# Patient Record
Sex: MALE | Race: WHITE
[De-identification: names, ages, dates, MRNs, and addresses within clinical notes are randomized per-mention and may not be internally consistent; named-entity substitution may affect disease eponyms.]

---

## 2019-12-16 ENCOUNTER — HOSPITAL ENCOUNTER (INPATIENT)
Dept: HOSPITAL 4 - SED | Age: 44
LOS: 2 days | Discharge: HOME | DRG: 720 | End: 2019-12-18
Attending: INTERNAL MEDICINE | Admitting: INTERNAL MEDICINE
Payer: MEDICAID

## 2019-12-16 VITALS — SYSTOLIC BLOOD PRESSURE: 132 MMHG

## 2019-12-16 VITALS — BODY MASS INDEX: 39.17 KG/M2 | HEIGHT: 75 IN | WEIGHT: 315 LBS

## 2019-12-16 VITALS — SYSTOLIC BLOOD PRESSURE: 144 MMHG

## 2019-12-16 VITALS — SYSTOLIC BLOOD PRESSURE: 127 MMHG

## 2019-12-16 DIAGNOSIS — L97.929: ICD-10-CM

## 2019-12-16 DIAGNOSIS — L97.919: ICD-10-CM

## 2019-12-16 DIAGNOSIS — A41.9: Primary | ICD-10-CM

## 2019-12-16 DIAGNOSIS — L03.115: ICD-10-CM

## 2019-12-16 DIAGNOSIS — E66.01: ICD-10-CM

## 2019-12-16 DIAGNOSIS — M25.519: ICD-10-CM

## 2019-12-16 DIAGNOSIS — Z59.0: ICD-10-CM

## 2019-12-16 DIAGNOSIS — L03.116: ICD-10-CM

## 2019-12-16 DIAGNOSIS — F19.10: ICD-10-CM

## 2019-12-16 DIAGNOSIS — G89.29: ICD-10-CM

## 2019-12-16 DIAGNOSIS — I87.2: ICD-10-CM

## 2019-12-16 LAB
ALBUMIN SERPL BCP-MCNC: 3.4 G/DL (ref 3.4–4.8)
ALT SERPL W P-5'-P-CCNC: 32 U/L (ref 12–78)
AMPHETAMINES UR QL SCN: POSITIVE
ANION GAP SERPL CALCULATED.3IONS-SCNC: 6 MMOL/L (ref 5–15)
APPEARANCE UR: CLEAR
AST SERPL W P-5'-P-CCNC: 15 U/L (ref 10–37)
BACTERIA URNS QL MICRO: (no result) /HPF
BARBITURATES UR QL SCN: NEGATIVE
BASOPHILS # BLD AUTO: 0 K/UL (ref 0–0.2)
BASOPHILS NFR BLD AUTO: 0.2 % (ref 0–2)
BENZODIAZ UR QL SCN: NEGATIVE
BILIRUB SERPL-MCNC: 0.7 MG/DL (ref 0–1)
BILIRUB UR QL STRIP: NEGATIVE
BUN SERPL-MCNC: 16 MG/DL (ref 8–21)
BZE UR QL SCN: NEGATIVE
CALCIUM SERPL-MCNC: 8.7 MG/DL (ref 8.4–11)
CANNABINOIDS UR QL SCN: NEGATIVE
CHLORIDE SERPL-SCNC: 99 MMOL/L (ref 98–107)
COLOR UR: YELLOW
CREAT SERPL-MCNC: 1.19 MG/DL (ref 0.55–1.3)
EOSINOPHIL # BLD AUTO: 0 K/UL (ref 0–0.4)
EOSINOPHIL NFR BLD AUTO: 0.2 % (ref 0–4)
ERYTHROCYTE [DISTWIDTH] IN BLOOD BY AUTOMATED COUNT: 13.2 % (ref 9–15)
GFR SERPL CREATININE-BSD FRML MDRD: 86 ML/MIN (ref 90–?)
GLUCOSE SERPL-MCNC: 144 MG/DL (ref 70–99)
GLUCOSE UR STRIP-MCNC: NEGATIVE MG/DL
HCT VFR BLD AUTO: 43.2 % (ref 36–54)
HGB BLD-MCNC: 15 G/DL (ref 14–18)
HGB UR QL STRIP: (no result)
KETONES UR STRIP-MCNC: NEGATIVE MG/DL
LEUKOCYTE ESTERASE UR QL STRIP: NEGATIVE
LYMPHOCYTES # BLD AUTO: 1.2 K/UL (ref 1–5.5)
LYMPHOCYTES NFR BLD AUTO: 8.1 % (ref 20.5–51.5)
MCH RBC QN AUTO: 32 PG (ref 27–31)
MCHC RBC AUTO-ENTMCNC: 35 % (ref 32–36)
MCV RBC AUTO: 91 FL (ref 79–98)
METHADONE UR-SCNC: NEGATIVE UMOL/L
METHAMPHET UR-SCNC: POSITIVE UMOL/L
MONOCYTES # BLD MANUAL: 0.5 K/UL (ref 0–1)
MONOCYTES # BLD MANUAL: 3.3 % (ref 1.7–9.3)
MUCOUS THREADS URNS QL MICRO: (no result) /LPF
NEUTROPHILS # BLD AUTO: 13.1 K/UL (ref 1.8–7.7)
NEUTROPHILS NFR BLD AUTO: 88.2 % (ref 40–70)
NITRITE UR QL STRIP: NEGATIVE
OPIATES UR QL SCN: POSITIVE
OXYCODONE SERPL-MCNC: NEGATIVE NG/ML
PCP UR QL SCN: NEGATIVE
PH UR STRIP: 6.5 [PH] (ref 5–8)
PLATELET # BLD AUTO: 186 K/UL (ref 130–430)
POTASSIUM SERPL-SCNC: 3.8 MMOL/L (ref 3.5–5.1)
PROT UR QL STRIP: NEGATIVE
RBC # BLD AUTO: 4.77 MIL/UL (ref 4.2–6.2)
RBC #/AREA URNS HPF: (no result) /HPF (ref 0–3)
SODIUM SERPLBLD-SCNC: 133 MMOL/L (ref 136–145)
SP GR UR STRIP: 1.01 (ref 1–1.03)
TRICYCLICS UR-MCNC: NEGATIVE NG/ML
URINE PROPOXYPHENE SCREEN: NEGATIVE
UROBILINOGEN UR STRIP-MCNC: 0.2 MG/DL (ref 0.2–1)
WBC # BLD AUTO: 14.8 K/UL (ref 4.8–10.8)
WBC #/AREA URNS HPF: (no result) /HPF (ref 0–3)

## 2019-12-16 RX ADMIN — ENOXAPARIN SODIUM SCH MG: 40 INJECTION SUBCUTANEOUS at 21:00

## 2019-12-16 RX ADMIN — HYDROCODONE BITARTRATE AND ACETAMINOPHEN PRN TAB: 10; 325 TABLET ORAL at 18:01

## 2019-12-16 RX ADMIN — SODIUM CHLORIDE SCH MLS/HR: 9 INJECTION, SOLUTION INTRAVENOUS at 17:08

## 2019-12-16 RX ADMIN — DOCUSATE SODIUM SCH MG: 250 CAPSULE, LIQUID FILLED ORAL at 21:00

## 2019-12-16 RX ADMIN — DEXTROSE MONOHYDRATE SCH MLS/HR: 50 INJECTION, SOLUTION INTRAVENOUS at 21:05

## 2019-12-16 SDOH — ECONOMIC STABILITY - HOUSING INSECURITY: HOMELESSNESS: Z59.0

## 2019-12-16 NOTE — NUR
Medications

given. Educated the action and side effects of medications. Patient verbalized 
understanding. No adverse effects noted. Patient sitting in bed, eating food brought by 
wife. Provided patient with ice. No other needs. Call light with the patient. Safety 
precautions in place.

## 2019-12-16 NOTE — NUR
Administered Levofloxacin 750mg IV running 100ml/hr as ordered by Dr. Rodney.  
Patient tolerated the medication well.

## 2019-12-16 NOTE — NUR
ADMISSION:

The patient, JADE PRABHAKAR, 45 y/o, M admitted by , was given written information regarding 
hospital policies, unit procedures and contact persons.  



Valuables were checked and .

## 2019-12-16 NOTE — NUR
Patient will be admitted to care of Dr. George.  Admitted to Medsurg unit.  Will 
go to room 117B.  Belongings list completed.  Complete and up to date summary 
report printed. SBAR report given to Yani Gill RN at bedside with opportunity 
for questions.

## 2019-12-16 NOTE — NUR
MEDICATION

PATIENTS SCHEDULED MEDICATION GIVEN PER ORDER. PATIENT IS LAYING IN BED, PATIENT IS 
SLEEPING, ATTEMPTED TO ASK QUESTIONS FOR ADMISSION AND PATIENT IS REFUSING TO ANSWER STATES 
HE WANTS TO SLEEP. PATIENT EDUCATED ON CALL LIGHT FOR ASSISTANCE. CALL LIGHT IS WITH 
PATIENT. PATIENT IS REFUSING TO CHANGE INTO A GOWN PATIENT WANTS TO STAY IN REGULAR CLOTHES. 
PATIENT HAS NO COMPLAINTS AT THIS TIME. PATIENT HAS NO OTHER NEEDS AT THIS TIME.WILL 
CONTINUE TO MONITOR.

## 2019-12-16 NOTE — NUR
continuation of care

report was endorsed by admission nurse. patient is awake and alert laying in bed. breathing 
is equal and non labored. patient educated on call light for assistance. patient has no 
complaints at this time. call light is with patient educated to use for assistance. will 
continue to monitor.

## 2019-12-16 NOTE — NUR
rn closing note

patient appears to be resting with both eyes closed no signs of any distress, breathing is 
equal and non labored. patient has all safety precautions in place. call light is with him 
educated to use for  assistance.

## 2019-12-16 NOTE — NUR
Sleeping

Patient is sleeping. No signs of distress noted. Breathing even and unlabored. Call light 
with the patient. Safety precautions in place.

## 2019-12-16 NOTE — NUR
CONSULTATION PAGED/CALLED



Reason for Consultation: CELLULITIS

Person Who was Notified: JAIMIE 

Consulting Physician: DR. GUILLAUEM

Ordering Physician: DR. CEDILLO

## 2019-12-16 NOTE — NUR
Ambulated to the bathroom with a steady gait.  Urine specimen collected and 
dropped off at the lab.

## 2019-12-16 NOTE — NUR
Patient arrived in the ED c/o bilateral lower leg edema, redness and warm to 
touch with fevers that started yesterday.  He's also complaining of Right 
shoulder pain and limited ROM that's been ongoing for a couple of months now 
post mechanical fall.  Denied any nausea or vomiting.  Denied any shortness of 
breath.  Alert and oriented x4, respirations even and unlabored, ambulating 
with a steady gait, speaking in full sentences.  VS WNL, pain level 0/10. Wife 
at bedside.  Informed of the wait time.  Instructed to notify ED staff if there 
are any changes in condition or worsening of symptoms.  Patient verbalized 
understanding.

## 2019-12-16 NOTE — NUR
Opening notes

Received report. Patient is asleep and snoring. No signs of distress noted. Breathing even 
and unlabored. IV patent and intact, no signs of infiltration noted. No needs at this time. 
Call light with the patient. Safety precautions in place.

## 2019-12-16 NOTE — NUR
medication

patient scheduled medication given per order. patient is sleeping, wakes up then falls right 
back to sleep. patient has family at bed side. patient has all safety precautions in place. 
call light is with patient. educated to use call light for assistance. no other needs at 
this time. will continue to monitor.

## 2019-12-16 NOTE — NUR
# 18 gauge angiocath placed to LAC.  Use of asceptic technique.  Opsite placed 
over site.  Blood return noted.  Blood for lab drawn from site.  Flushed with 
10 cc of normal saline.  No evidence of infiltration noted.  Patient tolerated 
well.

## 2019-12-17 VITALS — SYSTOLIC BLOOD PRESSURE: 101 MMHG

## 2019-12-17 VITALS — SYSTOLIC BLOOD PRESSURE: 139 MMHG

## 2019-12-17 VITALS — SYSTOLIC BLOOD PRESSURE: 133 MMHG

## 2019-12-17 VITALS — SYSTOLIC BLOOD PRESSURE: 112 MMHG

## 2019-12-17 VITALS — SYSTOLIC BLOOD PRESSURE: 147 MMHG

## 2019-12-17 LAB
ALBUMIN SERPL BCP-MCNC: 3.1 G/DL (ref 3.4–4.8)
ALT SERPL W P-5'-P-CCNC: 34 U/L (ref 12–78)
ANION GAP SERPL CALCULATED.3IONS-SCNC: 6 MMOL/L (ref 5–15)
AST SERPL W P-5'-P-CCNC: 19 U/L (ref 10–37)
BASOPHILS # BLD AUTO: 0 K/UL (ref 0–0.2)
BASOPHILS NFR BLD AUTO: 0.3 % (ref 0–2)
BILIRUB SERPL-MCNC: 0.2 MG/DL (ref 0–1)
BUN SERPL-MCNC: 16 MG/DL (ref 8–21)
CALCIUM SERPL-MCNC: 8.5 MG/DL (ref 8.4–11)
CHLORIDE SERPL-SCNC: 102 MMOL/L (ref 98–107)
CREAT SERPL-MCNC: 1.15 MG/DL (ref 0.55–1.3)
EOSINOPHIL # BLD AUTO: 0.1 K/UL (ref 0–0.4)
EOSINOPHIL NFR BLD AUTO: 1 % (ref 0–4)
ERYTHROCYTE [DISTWIDTH] IN BLOOD BY AUTOMATED COUNT: 13.3 % (ref 9–15)
GFR SERPL CREATININE-BSD FRML MDRD: 90 ML/MIN (ref 90–?)
GLUCOSE SERPL-MCNC: 121 MG/DL (ref 70–99)
HCT VFR BLD AUTO: 41.6 % (ref 36–54)
HGB BLD-MCNC: 14.6 G/DL (ref 14–18)
LYMPHOCYTES # BLD AUTO: 2.4 K/UL (ref 1–5.5)
LYMPHOCYTES NFR BLD AUTO: 22.5 % (ref 20.5–51.5)
MCH RBC QN AUTO: 32 PG (ref 27–31)
MCHC RBC AUTO-ENTMCNC: 35 % (ref 32–36)
MCV RBC AUTO: 91 FL (ref 79–98)
MONOCYTES # BLD MANUAL: 1.1 K/UL (ref 0–1)
MONOCYTES # BLD MANUAL: 10.8 % (ref 1.7–9.3)
NEUTROPHILS # BLD AUTO: 6.9 K/UL (ref 1.8–7.7)
NEUTROPHILS NFR BLD AUTO: 65.4 % (ref 40–70)
PLATELET # BLD AUTO: 181 K/UL (ref 130–430)
POTASSIUM SERPL-SCNC: 3.8 MMOL/L (ref 3.5–5.1)
RBC # BLD AUTO: 4.55 MIL/UL (ref 4.2–6.2)
SODIUM SERPLBLD-SCNC: 137 MMOL/L (ref 136–145)
T4 FREE SERPL-MCNC: 0.9 NG/DL (ref 0.8–1.5)
TSH SERPL DL<=0.05 MIU/L-ACNC: 3.98 UIU/ML (ref 0.36–3.74)
WBC # BLD AUTO: 10.6 K/UL (ref 4.8–10.8)

## 2019-12-17 RX ADMIN — HYDROCODONE BITARTRATE AND ACETAMINOPHEN PRN TAB: 10; 325 TABLET ORAL at 20:43

## 2019-12-17 RX ADMIN — DEXTROSE MONOHYDRATE SCH MLS/HR: 50 INJECTION, SOLUTION INTRAVENOUS at 07:07

## 2019-12-17 RX ADMIN — HYDROCODONE BITARTRATE AND ACETAMINOPHEN PRN TAB: 10; 325 TABLET ORAL at 00:41

## 2019-12-17 RX ADMIN — DEXTROSE MONOHYDRATE SCH MLS/HR: 50 INJECTION, SOLUTION INTRAVENOUS at 18:57

## 2019-12-17 RX ADMIN — ENOXAPARIN SODIUM SCH MG: 40 INJECTION SUBCUTANEOUS at 21:00

## 2019-12-17 RX ADMIN — SODIUM CHLORIDE SCH MLS/HR: 9 INJECTION, SOLUTION INTRAVENOUS at 16:47

## 2019-12-17 RX ADMIN — DOCUSATE SODIUM SCH MG: 250 CAPSULE, LIQUID FILLED ORAL at 08:33

## 2019-12-17 RX ADMIN — SODIUM CHLORIDE SCH MLS/HR: 9 INJECTION, SOLUTION INTRAVENOUS at 08:31

## 2019-12-17 RX ADMIN — DOCUSATE SODIUM SCH MG: 250 CAPSULE, LIQUID FILLED ORAL at 21:00

## 2019-12-17 RX ADMIN — HYDROCODONE BITARTRATE AND ACETAMINOPHEN PRN TAB: 10; 325 TABLET ORAL at 08:32

## 2019-12-17 RX ADMIN — SODIUM CHLORIDE SCH MLS/HR: 9 INJECTION, SOLUTION INTRAVENOUS at 00:38

## 2019-12-17 NOTE — NUR
****P.T. NOTES****

P.T. TOMY COMPLETED; PATIENT MAY AMBU W/ NURSE AD ARI; O2 SAT ROOM AIR=97%

-------------------------------------------------------------------------------

Addendum: 12/17/19 at 1853 by Key García PT

-------------------------------------------------------------------------------

Amended: Links added.

## 2019-12-17 NOTE — NUR
RN ROUNDS:

PATIENT IS ASLEEP IN BED. NO SIGNS OF DISTRESS OR SHORTNESS OF BREATH NOTED. PATIENT 
TOLERATING OXYGEN AT ROOM AIR. NO SIGNS OF DISTRESS OR SHORTNESS OF BREATH NOTED. WILL 
CONTINUE TO MONITOR PATIENT FOR ANY CHANGES.

## 2019-12-17 NOTE — NUR
Resting

Patient resting, complaining of pain. PRN pain medication given. Educated the action and 
side effects of medications. Patient verbalized understanding. VSS. No other needs. Call 
light with the patient. Safety precautions in place.

## 2019-12-17 NOTE — NUR
RN ROUNDS:

PATIENT IS ASLEEP IN BED. NO SIGNS OF DISTRESS OR SHORTNESS OF BREATH NOTED. PATIENT IN 
STABLE CONDITION. SAFETY, FALL AND ASPIRATION PRECAUTIONS ARE IN PLACE. BED LOCKED IN LOWEST 
POSITION WITH CALL LIGHT IN REACH. WILL CONTINUE TO MONITOR PATIENT FOR ANY CHANGES.

## 2019-12-17 NOTE — NUR
OPENING NOTES:

RECEIVED PATIENT FROM NIGHT SHIFT NURSE. PATIENT IS AWAKE AND ALERT x4 SITTING UP IN BED. 
PATIENT STATES HE HAS PAIN 6/10 IN HIS RIGHT SHOULDER. PATIENT INFORMED PRN PAIN MEDS WILL 
BE GIVEN. PATIENT IS TOLERATING OXYGEN AT ROOM AIR WITH NO SIGNS OF DISTRESS OR SHORTNESS OF 
BREATH NOTED. PATIENT IN STABLE CONDITION. SAFETY, FALL, AND ASPIRATION PRECAUTIONS ARE IN 
PLACE. BED LOCKED IN LOWEST POSITION WITH CALL LIGHT IN REACH. WILL CONTINUE TO MONITOR 
PATIENT FOR ANY CHANGES.

## 2019-12-17 NOTE — NUR
Opening notes



Pt asleep, easily arousable.  VSS, afebrile, complained of 6/10 pain.  Medicated with Norco 
10/325mg PO as needed.  IV saline lock L. AC clear and patent.  Call light within reach.  
Safety maintained.  To monitor.

## 2019-12-17 NOTE — NUR
RN ROUNDS:

PATIENT IS ASLEEP IN BED. NO SIGNS OF DISTRESS OR SHORTNESS OF BREATH NOTED. IV SITE IS 
PATENT WITH NO SIGNS OF INFILTRATION. PATIENT IN STABLE CONDITION. SAFETY, FALL, AND 
ASPIRATION PRECAUTIONS ARE IN PLACE. BED LOCKED IN LOWEST POSITION WITH CALL LIGHT IN REACH. 
WILL CONTINUE TO MONITOR PATIENT FOR ANY CHANGES.

## 2019-12-17 NOTE — NUR
Closing notes

Patient resting in bed, waiting for breakfast. No signs of distress noted. Breathing even 
and unlabored. IV patent and intact, no signs of infiltration noted. all needs met 
throughout the shift. Call light with the patient. Safety precautions in place. Will endorse 
care to day shift RN.

## 2019-12-17 NOTE — NUR
Resting

Patient resting in bed. No signs of distress noted. Patient ambulated to bathroom with 
assist. Patient back in bed. No other needs. Call light with the patient. Safety precautions 
in place.

## 2019-12-17 NOTE — NUR
Sleeping

No signs of distress noted. Breathing even and unlabored.  Call light with the patient. 
Safety precautions in place.

## 2019-12-17 NOTE — NUR
CLOSING NOTES:

PATIENT IS ASLEEP IN BED. PATIENT IS TOLERATING OXYGEN AT ROOM AIR WITH NO SIGNS OF DISTRESS 
OR SHORTNESS OF BREATH NOTED. IV SITE IS PATENT WITH NO SIGNS OF INFILTRATION. PATIENT IN 
STABLE CONDITION. SAFETY, FALL, AND ASPIRATION PRECAUTIONS REMAINED IN PLACE THROUGHOUT THE 
SHIFT. BED LOCKED IN LOWEST POSITION WITH CALL LIGHT IN REACH. WILL ENDORSE PATIENT CARE TO 
ONCOMING NIGHT SHIFT NURSE.

## 2019-12-18 VITALS — SYSTOLIC BLOOD PRESSURE: 120 MMHG

## 2019-12-18 VITALS — SYSTOLIC BLOOD PRESSURE: 119 MMHG

## 2019-12-18 VITALS — SYSTOLIC BLOOD PRESSURE: 124 MMHG

## 2019-12-18 VITALS — SYSTOLIC BLOOD PRESSURE: 128 MMHG

## 2019-12-18 RX ADMIN — DEXTROSE MONOHYDRATE SCH MLS/HR: 50 INJECTION, SOLUTION INTRAVENOUS at 06:40

## 2019-12-18 RX ADMIN — HYDROCODONE BITARTRATE AND ACETAMINOPHEN PRN TAB: 10; 325 TABLET ORAL at 06:48

## 2019-12-18 RX ADMIN — DOCUSATE SODIUM SCH MG: 250 CAPSULE, LIQUID FILLED ORAL at 09:55

## 2019-12-18 RX ADMIN — HYDROCODONE BITARTRATE AND ACETAMINOPHEN PRN TAB: 10; 325 TABLET ORAL at 13:58

## 2019-12-18 RX ADMIN — SODIUM CHLORIDE SCH MLS/HR: 9 INJECTION, SOLUTION INTRAVENOUS at 16:07

## 2019-12-18 RX ADMIN — SODIUM CHLORIDE SCH MLS/HR: 9 INJECTION, SOLUTION INTRAVENOUS at 09:54

## 2019-12-18 RX ADMIN — SODIUM CHLORIDE SCH MLS/HR: 9 INJECTION, SOLUTION INTRAVENOUS at 00:27

## 2019-12-18 NOTE — NUR
Rounds



Pt sleeping, respirations even and unlabored.  Call light within reach.  Bed low, locked, 
siderails upx 3.  To monitor.

## 2019-12-18 NOTE — NUR
rounds

seen by dr abreu and ordered to go home after vanco is infused for 1700. mom at bedside. no 
sob noted.

## 2019-12-18 NOTE — NUR
DISCHARGE



PATIENT PROVIDED WITH ARM SLING AS ORDERED. PATIENT WITH GIRLFRIEND ACCOMPANIED TO LOBBY. 
DISCHARGE TO HOME WITH PATIENT OWN CAR IN STABLE CONDITION.

## 2019-12-18 NOTE — NUR
initial notes

rec patient awake sitting at bedside. denies pain at this time. resp easy and unlabored. no 
sob noted.bilateral leg cellulitis swelling improving and elevated on a pillow. denies pain 
at this time. wife at bedside .

## 2019-12-18 NOTE — NUR
Closing notes



Pt asleep, easily arousable.  No s/s distress noted.  Pt c/o pain 6/10 R. shoulder.  
Medicated with Norco 10/325 1tab.  IV antibiotic administered at ordered rate L. AC good 
blood return.  Call light within reach.  Safety measures maintained.  To endorse to AM 
nurse.

## 2019-12-18 NOTE — NUR
SS NOTES:



MSW was referred by nursing to see patient for SS assessment/homelessness. Demographic 
information confirmed. 



MSW met with patient at bedside. Pt was asleep but agreed to wake up to be interviewed. Pt 
appeared to be well groomed, cooperative but no eye contact (answered with eyes closed). Pt 
was oriented x4 and affect was appropriate. 



Pt is a 45 y/o single white male who came in via ED for bilateral leg cellulitis. Pt states 
he has been homeless for 3 years now and has been staying in/out motels, in his car or on 
the streets around [facility]. Pt is independent with his ADL's 
(driving included, owns 2 vehicles) and does not require any DME. Pt states his only source 
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 Pt 
states he does not receive SSI and has not applied for general relief from SS. Pt states he 
has a history of anxiety; MSW inquired on his triggers, pt stated "when I need to answer 
questions from people" (such as what SS is doing). Pt states he takes amphetamine (amount 
varies) and states last intake was 3 days ago. Pt denies current trouble with the law but 
states he had a DUI in 1996 and did Alcoholic Anonymous. 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 Pt states he does not have any support system and his relationship 
with family is "somewhat" okay. 



Pt had eyes closed the entire time, but was still cooperative. MSW encouraged pt to connect 
self with PCP and to try applying for SSI; pt agreed and thanked SW. When discharged, pt 
wishes to be discharged back to [place] [address] in [place].



MSW provided pt with winter shelter list, substance abuse treatments, outpatient mental 
health, county clinics, Homeless Assistance, and Homeless waiver on chart. No further SS 
needs identified but will remain available for support and when needed. 




-------------------------------------------------------------------------------

Addendum: 12/18/19 at 1614 by Daksha MORAN

-------------------------------------------------------------------------------

LUISA received a call from patient [facility] list and his [employer] policy 
number; LUISA provided pt with info.  If SNF is DCP; pt states no preference as long as it is 
around the area and no [facility] preference.

## 2019-12-18 NOTE — NUR
DISCHARGE INSTRUCTIONS



PATIENT PROVIDED WITH DISCHARGE INSTRUCTIONS. HANDED PRESCRIPTIONS FOR PAIN AND PO 
ANTIBIOTIC. VERIFIED BELONGINGS LIST WITH PATIENT. VITAL SIGNS STABLE. VANCOMYCIN INFUSION 
COMPLETED. IV LINE REMOVED PRESSURE DRESSING APPLIED.

## 2019-12-18 NOTE — NUR
Rounds



Pt asleep, no s/s distress or discomfort noted.  Call light within reach.  Bed low, locked, 
siderails up x3.  To monitor.

## 2019-12-18 NOTE — NUR
Dietitian Recommendation



* Recommend continuing regular diet

LP, RD



Please refer to Nutrition Assessment for details.

-------------------------------------------------------------------------------

Addendum: 12/18/19 at 1322 by Noemí Reza RD

-------------------------------------------------------------------------------

Amended: Links added.

## 2019-12-18 NOTE — NUR
Rounds



Pt asleep.  No s/s distress noted.  IV antibiotic infusing L. AC 18G no s/s infiltration.  
Call light within reach.  Safety measures maintained.  To monitor.

## 2019-12-18 NOTE — NUR
OPENING NOTES



RECEIVED PATIENT IN BED AAO X4. BREATHING UNLABORED ON ROOM AIR. NO C/O PAIN AT THIS TIME. 
PATIENT FOR DISCHARGE HOME TONIGHT. GIRLFRIEND AT BEDSIDE. BED IN LOWEST LOCKED POSITION. 
CALL LIGHT WITH IN REACH.

## 2019-12-18 NOTE — NUR
dante notes

pt will be d/c after abx is infused. denies pain. no sob noted. girlfriend at bedside and 
waiting for patient. no sob noted.

## 2019-12-18 NOTE — NUR
rounds

vanco ivbp started. infusing well and no infiltration noted.c/p pain on the l shoulder and 
norco given and notified dr abreu who is making rounds still.

## 2020-04-20 ENCOUNTER — HOSPITAL ENCOUNTER (EMERGENCY)
Dept: HOSPITAL 4 - SED | Age: 45
Discharge: HOME | End: 2020-04-20
Payer: MEDICAID

## 2020-04-20 VITALS — SYSTOLIC BLOOD PRESSURE: 143 MMHG

## 2020-04-20 VITALS — WEIGHT: 315 LBS | HEIGHT: 74 IN | BODY MASS INDEX: 40.43 KG/M2

## 2020-04-20 VITALS — SYSTOLIC BLOOD PRESSURE: 140 MMHG

## 2020-04-20 DIAGNOSIS — S13.4XXA: Primary | ICD-10-CM
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
## 2020-04-20 DIAGNOSIS — G44.209: ICD-10-CM

## 2020-04-20 DIAGNOSIS — Z79.899: ICD-10-CM
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
## 2020-04-20 DIAGNOSIS — S43.401A: ICD-10-CM
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
## 2020-04-20 DIAGNOSIS — M54.12: ICD-10-CM

## 2022-01-18 ENCOUNTER — HOSPITAL ENCOUNTER (EMERGENCY)
Dept: HOSPITAL 4 - SED | Age: 47
Discharge: HOME | End: 2022-01-18
Payer: MEDICAID

## 2022-01-18 VITALS — SYSTOLIC BLOOD PRESSURE: 133 MMHG

## 2022-01-18 VITALS — HEIGHT: 74 IN | BODY MASS INDEX: 40.43 KG/M2 | WEIGHT: 315 LBS
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
## 2022-01-18 DIAGNOSIS — S91.332A: Primary | ICD-10-CM
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
## 2022-01-18 NOTE — NUR
Patient triaged and placed in waiting room. VSS and patient appears in no acute 
distress at this time. awaiting available bed, and MD notified of need for MSE.

## 2022-01-18 NOTE — NUR
Patient given written and verbal discharge instructions and verbalizes 
understanding.  ER MD discussed with patient the results and treatment 
provided. Patient in stable condition. ID arm band removed. 

NO Rx of  given. Patient educated on pain management and to follow up with PMD. 
Pain Scale 3.

Opportunity for questions provided and answered. Medication side effect fact 
sheet provided.

## 2022-01-26 ENCOUNTER — HOSPITAL ENCOUNTER (EMERGENCY)
Dept: HOSPITAL 4 - SED | Age: 47
Discharge: HOME | End: 2022-01-26
Payer: MEDICAID

## 2022-01-26 VITALS — BODY MASS INDEX: 39.78 KG/M2 | HEIGHT: 74 IN | WEIGHT: 310 LBS

## 2022-01-26 VITALS — SYSTOLIC BLOOD PRESSURE: 143 MMHG

## 2022-01-26 DIAGNOSIS — Z79.899: ICD-10-CM
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
## 2022-01-26 DIAGNOSIS — S91.332A: Primary | ICD-10-CM
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
## 2022-01-26 NOTE — NUR
Placed in room 01  . Placed on cardiac monitor, blood pressure machine and 
pulse oximeter. To gown for exam. Side rails up.

Report given to ANUEL

## 2022-05-12 ENCOUNTER — HOSPITAL ENCOUNTER (EMERGENCY)
Dept: HOSPITAL 4 - SED | Age: 47
LOS: 1 days | Discharge: HOME | End: 2022-05-13
Payer: MEDICAID

## 2022-05-12 VITALS — WEIGHT: 315 LBS | BODY MASS INDEX: 40.43 KG/M2 | HEIGHT: 74 IN | SYSTOLIC BLOOD PRESSURE: 158 MMHG
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
## 2022-05-12 DIAGNOSIS — S43.102A: ICD-10-CM
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
## 2022-05-12 DIAGNOSIS — M25.512: Primary | ICD-10-CM

## 2022-05-12 DIAGNOSIS — S42.022A: ICD-10-CM

## 2022-05-12 DIAGNOSIS — S43.086A: ICD-10-CM

## 2022-05-12 PROCEDURE — 73030 X-RAY EXAM OF SHOULDER: CPT

## 2022-05-12 PROCEDURE — 99283 EMERGENCY DEPT VISIT LOW MDM: CPT

## 2022-05-12 PROCEDURE — 96372 THER/PROPH/DIAG INJ SC/IM: CPT
